# Patient Record
Sex: FEMALE | Race: WHITE | Employment: FULL TIME | URBAN - METROPOLITAN AREA
[De-identification: names, ages, dates, MRNs, and addresses within clinical notes are randomized per-mention and may not be internally consistent; named-entity substitution may affect disease eponyms.]

---

## 2018-07-18 ENCOUNTER — ANESTHESIA EVENT (OUTPATIENT)
Dept: SURGERY | Age: 58
End: 2018-07-18
Payer: COMMERCIAL

## 2018-07-18 RX ORDER — SIMVASTATIN 20 MG/1
20 TABLET, FILM COATED ORAL
COMMUNITY

## 2018-07-18 RX ORDER — PANTOPRAZOLE SODIUM 40 MG/1
40 TABLET, DELAYED RELEASE ORAL
COMMUNITY

## 2018-07-18 RX ORDER — RALOXIFENE HYDROCHLORIDE 60 MG/1
60 TABLET, FILM COATED ORAL
COMMUNITY

## 2018-07-18 RX ORDER — OMEPRAZOLE 20 MG/1
20 CAPSULE, DELAYED RELEASE ORAL
COMMUNITY

## 2018-07-18 RX ORDER — ATENOLOL 50 MG/1
50 TABLET ORAL
COMMUNITY

## 2018-07-18 RX ORDER — SODIUM BICARBONATE 650 MG/1
650 TABLET ORAL 2 TIMES DAILY
COMMUNITY

## 2018-07-18 NOTE — PERIOP NOTES
INSTRUCTIONS [address] [address] [address] MAIN OR [phone] MAIN PRE OP [phone] AMBULATORY PRE OP [phone] PRE-ADMISSION TESTING 21  Surgery Date:   7/19/2018 Is surgery arrival time given by surgeon? NO If [facility] staff will call you between 3 and 7pm the day before your surgery with your arrival time. (If your surgery is on a Monday, we will call you the Friday before.) Call [phone] after 7pm Monday-Friday if you did not receive your arrival time. Answers to Common Questions When You 
Arrive Arrive at the [address] [facility] on the day of your surgery Have your insurance card, photo ID, and any copayment (if needed) Food 
 and  
Drink NO food or drink after midnight the night before surgery This means NO water, gum, mints, coffee, juice, etc. 
No alcohol (beer, wine, liquor) 24 hours before and after surgery Medicine to TAKE Morning of Surgery MEDICATIONS TO TAKE THE MORNING OF SURGERY WITH A SIP OF WATER:  
 Thiola, Atenolol, Raloxefine, Omeprazole, Pantoprazole Medicine To 
STOP  
FOR PAIN 
 NO Aspirin for pain  NO Non-Steroidal Anti-Inflammatory Drugs (NSAIDs:  
for example, Ibuprofen (Advil, Motrin), Naproxen (Aleve)  STOP herbal supplements and vitamins 1 week before surgery  You can take Tylenol  follow instructions on the bottle Blood Thinners  If you take Aspirin, Plavix, Coumadin, blood-thinning or anti-clot medicine, talk to your surgeon and/or follow the instructions from the doctor who told you to take that medicine Clothing Jewelry Valuables Bathing CLOTHING 
 Wear loose, comfortable clothes  Wear glasses instead of contacts  Leave money, jewelry and valuables at home  No make-up, particularly mascara, the day of surgery  REMOVE ALL piercings, rings, and jewelry - leave at home 
 Wear hair loose or down; no pony-tails, buns, or metal hair clips BATHING 
 Follow all special bath instructions (for total joint replacement, spine and bowel surgeries.)  If you shower the morning of surgery, please do not apply any lotions, powders, or deodorants afterwards. Do not shave or trim anywhere 24 hours before surgery. Going Home 
or Spending the Night  SAME-DAY SURGERY: You must have a responsible adult drive you home and stay with you 24 hours after surgery  ADMITS: If your doctor is keeping you into the hospital after surgery, leave personal belongings/luggage in your car until you have a hospital room number. Hospital discharge time is 12 noon Drivers must be here before 12 noon unless you are told differently Follow all instructions so your surgery wont be cancelled. Please, be on time. If a situation occurs and you are delayed the day of surgery, call [phone] or [phone]. If your physical condition changes (like a fever, cold, flu, etc.) call your surgeon as soon as possible. The [facility] staff can be reached at [phone]. OTHER SPECIAL INSTRUCTIONS:  Free  parking 7am-5pm 
 
The patient was contacted  via phone. She  verbalize  understanding of all instructions and does not  need reinforcement.

## 2018-07-19 ENCOUNTER — APPOINTMENT (OUTPATIENT)
Dept: GENERAL RADIOLOGY | Age: 58
End: 2018-07-19
Attending: PODIATRIST
Payer: COMMERCIAL

## 2018-07-19 ENCOUNTER — HOSPITAL ENCOUNTER (OUTPATIENT)
Age: 58
Setting detail: OUTPATIENT SURGERY
Discharge: HOME | End: 2018-07-19
Attending: PODIATRIST | Admitting: PODIATRIST
Payer: COMMERCIAL

## 2018-07-19 ENCOUNTER — ANESTHESIA (OUTPATIENT)
Dept: SURGERY | Age: 58
End: 2018-07-19
Payer: COMMERCIAL

## 2018-07-19 VITALS
HEIGHT: 64 IN | RESPIRATION RATE: 18 BRPM | SYSTOLIC BLOOD PRESSURE: 129 MMHG | WEIGHT: 134 LBS | DIASTOLIC BLOOD PRESSURE: 66 MMHG | OXYGEN SATURATION: 99 % | TEMPERATURE: 97.9 F | HEART RATE: 85 BPM | BODY MASS INDEX: 22.88 KG/M2

## 2018-07-19 PROBLEM — M20.22 HALLUX RIGIDUS OF LEFT FOOT: Status: ACTIVE | Noted: 2018-07-19

## 2018-07-19 PROCEDURE — 74011250637 HC RX REV CODE- 250/637: Performed by: PODIATRIST

## 2018-07-19 PROCEDURE — 77030020782 HC GWN BAIR PAWS FLX 3M -B

## 2018-07-19 PROCEDURE — 77030003044 HC WRE K WRGH -B: Performed by: PODIATRIST

## 2018-07-19 PROCEDURE — C1713 ANCHOR/SCREW BN/BN,TIS/BN: HCPCS | Performed by: PODIATRIST

## 2018-07-19 PROCEDURE — 76001 XR FLUOROSCOPY OVER 60 MINUTES: CPT

## 2018-07-19 PROCEDURE — 77030031139 HC SUT VCRL2 J&J -A: Performed by: PODIATRIST

## 2018-07-19 PROCEDURE — 74011250636 HC RX REV CODE- 250/636: Performed by: ANESTHESIOLOGY

## 2018-07-19 PROCEDURE — 77030020143 HC AIRWY LARYN INTUB CGAS -A: Performed by: ANESTHESIOLOGY

## 2018-07-19 PROCEDURE — 74011250636 HC RX REV CODE- 250/636

## 2018-07-19 PROCEDURE — 77030021807 HC PIN TEMP FIX WRGH -B: Performed by: PODIATRIST

## 2018-07-19 PROCEDURE — 77030032490 HC SLV COMPR SCD KNE COVD -B: Performed by: PODIATRIST

## 2018-07-19 PROCEDURE — 77030021305 HC BLD TEAR RASP BRSM -B: Performed by: PODIATRIST

## 2018-07-19 PROCEDURE — 77030011640 HC PAD GRND REM COVD -A: Performed by: PODIATRIST

## 2018-07-19 PROCEDURE — 77030002986 HC SUT PROL J&J -A: Performed by: PODIATRIST

## 2018-07-19 PROCEDURE — C1762 CONN TISS, HUMAN(INC FASCIA): HCPCS | Performed by: PODIATRIST

## 2018-07-19 PROCEDURE — 77030006773 HC BLD SAW OSC BRSM -A: Performed by: PODIATRIST

## 2018-07-19 PROCEDURE — 77030029732 HC BIT DRL ORTHOLOC 3DI WRGH -B: Performed by: PODIATRIST

## 2018-07-19 PROCEDURE — 77030000032 HC CUF TRNQT ZIMM -B: Performed by: PODIATRIST

## 2018-07-19 PROCEDURE — 76210000050 HC AMBSU PH II REC 0.5 TO 1 HR: Performed by: PODIATRIST

## 2018-07-19 PROCEDURE — 77030018836 HC SOL IRR NACL ICUM -A: Performed by: PODIATRIST

## 2018-07-19 PROCEDURE — 76060000064 HC AMB SURG ANES 2 TO 2.5 HR: Performed by: PODIATRIST

## 2018-07-19 PROCEDURE — 76030000004 HC AMB SURG OR TIME 2 TO 2.5: Performed by: PODIATRIST

## 2018-07-19 PROCEDURE — 74011000250 HC RX REV CODE- 250

## 2018-07-19 PROCEDURE — 77030028224 HC PDNG CST BSNM -A: Performed by: PODIATRIST

## 2018-07-19 PROCEDURE — 74011000250 HC RX REV CODE- 250: Performed by: PODIATRIST

## 2018-07-19 PROCEDURE — 74011250636 HC RX REV CODE- 250/636: Performed by: PODIATRIST

## 2018-07-19 PROCEDURE — 77030016545: Performed by: PODIATRIST

## 2018-07-19 PROCEDURE — 77030020269 HC MISC IMPL: Performed by: PODIATRIST

## 2018-07-19 PROCEDURE — 77030002933 HC SUT MCRYL J&J -A: Performed by: PODIATRIST

## 2018-07-19 PROCEDURE — 77030029099 HC BN WAX SSPC -A: Performed by: PODIATRIST

## 2018-07-19 DEVICE — IMPLANTABLE DEVICE
Type: IMPLANTABLE DEVICE | Site: FOOT | Status: FUNCTIONAL
Brand: ORTHOLOC

## 2018-07-19 DEVICE — GRAFT HUM TISS W4XL4CM CRYOPRESERVED AMNIO MEM REGEN MTRX: Type: IMPLANTABLE DEVICE | Site: FOOT | Status: FUNCTIONAL

## 2018-07-19 DEVICE — IMPLANTABLE DEVICE
Type: IMPLANTABLE DEVICE | Site: FOOT | Status: FUNCTIONAL
Brand: ORTHOLOC 3DI

## 2018-07-19 RX ORDER — BUPIVACAINE HYDROCHLORIDE 5 MG/ML
INJECTION, SOLUTION EPIDURAL; INTRACAUDAL AS NEEDED
Status: DISCONTINUED | OUTPATIENT
Start: 2018-07-19 | End: 2018-07-19 | Stop reason: HOSPADM

## 2018-07-19 RX ORDER — ALBUTEROL SULFATE 0.83 MG/ML
2.5 SOLUTION RESPIRATORY (INHALATION) AS NEEDED
Status: DISCONTINUED | OUTPATIENT
Start: 2018-07-19 | End: 2018-07-19 | Stop reason: HOSPADM

## 2018-07-19 RX ORDER — KETOROLAC TROMETHAMINE 30 MG/ML
INJECTION, SOLUTION INTRAMUSCULAR; INTRAVENOUS AS NEEDED
Status: DISCONTINUED | OUTPATIENT
Start: 2018-07-19 | End: 2018-07-19 | Stop reason: HOSPADM

## 2018-07-19 RX ORDER — LIDOCAINE HYDROCHLORIDE 10 MG/ML
INJECTION INFILTRATION; PERINEURAL AS NEEDED
Status: DISCONTINUED | OUTPATIENT
Start: 2018-07-19 | End: 2018-07-19 | Stop reason: HOSPADM

## 2018-07-19 RX ORDER — FAMOTIDINE 10 MG/ML
INJECTION INTRAVENOUS AS NEEDED
Status: DISCONTINUED | OUTPATIENT
Start: 2018-07-19 | End: 2018-07-19 | Stop reason: HOSPADM

## 2018-07-19 RX ORDER — ONDANSETRON 2 MG/ML
4 INJECTION INTRAMUSCULAR; INTRAVENOUS AS NEEDED
Status: DISCONTINUED | OUTPATIENT
Start: 2018-07-19 | End: 2018-07-19 | Stop reason: HOSPADM

## 2018-07-19 RX ORDER — PROPOFOL 10 MG/ML
INJECTION, EMULSION INTRAVENOUS AS NEEDED
Status: DISCONTINUED | OUTPATIENT
Start: 2018-07-19 | End: 2018-07-19 | Stop reason: HOSPADM

## 2018-07-19 RX ORDER — SODIUM CHLORIDE, SODIUM LACTATE, POTASSIUM CHLORIDE, CALCIUM CHLORIDE 600; 310; 30; 20 MG/100ML; MG/100ML; MG/100ML; MG/100ML
150 INJECTION, SOLUTION INTRAVENOUS CONTINUOUS
Status: DISCONTINUED | OUTPATIENT
Start: 2018-07-19 | End: 2018-07-19 | Stop reason: HOSPADM

## 2018-07-19 RX ORDER — OXYCODONE AND ACETAMINOPHEN 10; 325 MG/1; MG/1
1 TABLET ORAL ONCE
Status: COMPLETED | OUTPATIENT
Start: 2018-07-19 | End: 2018-07-19

## 2018-07-19 RX ORDER — ONDANSETRON 2 MG/ML
INJECTION INTRAMUSCULAR; INTRAVENOUS AS NEEDED
Status: DISCONTINUED | OUTPATIENT
Start: 2018-07-19 | End: 2018-07-19 | Stop reason: HOSPADM

## 2018-07-19 RX ORDER — SODIUM CHLORIDE, SODIUM LACTATE, POTASSIUM CHLORIDE, CALCIUM CHLORIDE 600; 310; 30; 20 MG/100ML; MG/100ML; MG/100ML; MG/100ML
125 INJECTION, SOLUTION INTRAVENOUS CONTINUOUS
Status: DISCONTINUED | OUTPATIENT
Start: 2018-07-19 | End: 2018-07-19 | Stop reason: HOSPADM

## 2018-07-19 RX ORDER — CEFAZOLIN SODIUM/WATER 2 G/20 ML
2 SYRINGE (ML) INTRAVENOUS ONCE
Status: COMPLETED | OUTPATIENT
Start: 2018-07-19 | End: 2018-07-19

## 2018-07-19 RX ORDER — HYDROMORPHONE HYDROCHLORIDE 1 MG/ML
0.5 INJECTION, SOLUTION INTRAMUSCULAR; INTRAVENOUS; SUBCUTANEOUS
Status: DISCONTINUED | OUTPATIENT
Start: 2018-07-19 | End: 2018-07-19 | Stop reason: HOSPADM

## 2018-07-19 RX ORDER — LIDOCAINE HYDROCHLORIDE 20 MG/ML
INJECTION, SOLUTION EPIDURAL; INFILTRATION; INTRACAUDAL; PERINEURAL AS NEEDED
Status: DISCONTINUED | OUTPATIENT
Start: 2018-07-19 | End: 2018-07-19 | Stop reason: HOSPADM

## 2018-07-19 RX ORDER — FENTANYL CITRATE 50 UG/ML
INJECTION, SOLUTION INTRAMUSCULAR; INTRAVENOUS AS NEEDED
Status: DISCONTINUED | OUTPATIENT
Start: 2018-07-19 | End: 2018-07-19 | Stop reason: HOSPADM

## 2018-07-19 RX ORDER — DEXAMETHASONE SODIUM PHOSPHATE 4 MG/ML
INJECTION, SOLUTION INTRA-ARTICULAR; INTRALESIONAL; INTRAMUSCULAR; INTRAVENOUS; SOFT TISSUE AS NEEDED
Status: DISCONTINUED | OUTPATIENT
Start: 2018-07-19 | End: 2018-07-19 | Stop reason: HOSPADM

## 2018-07-19 RX ORDER — DIPHENHYDRAMINE HYDROCHLORIDE 50 MG/ML
12.5 INJECTION, SOLUTION INTRAMUSCULAR; INTRAVENOUS AS NEEDED
Status: DISCONTINUED | OUTPATIENT
Start: 2018-07-19 | End: 2018-07-19 | Stop reason: HOSPADM

## 2018-07-19 RX ORDER — LIDOCAINE HYDROCHLORIDE 10 MG/ML
0.1 INJECTION, SOLUTION EPIDURAL; INFILTRATION; INTRACAUDAL; PERINEURAL AS NEEDED
Status: DISCONTINUED | OUTPATIENT
Start: 2018-07-19 | End: 2018-07-19 | Stop reason: HOSPADM

## 2018-07-19 RX ORDER — MIDAZOLAM HYDROCHLORIDE 1 MG/ML
INJECTION, SOLUTION INTRAMUSCULAR; INTRAVENOUS AS NEEDED
Status: DISCONTINUED | OUTPATIENT
Start: 2018-07-19 | End: 2018-07-19 | Stop reason: HOSPADM

## 2018-07-19 RX ORDER — PROPOFOL 10 MG/ML
INJECTION, EMULSION INTRAVENOUS
Status: DISCONTINUED | OUTPATIENT
Start: 2018-07-19 | End: 2018-07-19 | Stop reason: HOSPADM

## 2018-07-19 RX ADMIN — OXYCODONE HYDROCHLORIDE AND ACETAMINOPHEN 1 TABLET: 10; 325 TABLET ORAL at 13:15

## 2018-07-19 RX ADMIN — FENTANYL CITRATE 50 MCG: 50 INJECTION, SOLUTION INTRAMUSCULAR; INTRAVENOUS at 10:46

## 2018-07-19 RX ADMIN — FENTANYL CITRATE 25 MCG: 50 INJECTION, SOLUTION INTRAMUSCULAR; INTRAVENOUS at 09:56

## 2018-07-19 RX ADMIN — SODIUM CHLORIDE, SODIUM LACTATE, POTASSIUM CHLORIDE, AND CALCIUM CHLORIDE 150 ML/HR: 600; 310; 30; 20 INJECTION, SOLUTION INTRAVENOUS at 09:00

## 2018-07-19 RX ADMIN — FAMOTIDINE 20 MG: 10 INJECTION INTRAVENOUS at 10:24

## 2018-07-19 RX ADMIN — SODIUM CHLORIDE, SODIUM LACTATE, POTASSIUM CHLORIDE, AND CALCIUM CHLORIDE: 600; 310; 30; 20 INJECTION, SOLUTION INTRAVENOUS at 12:14

## 2018-07-19 RX ADMIN — Medication 2 G: at 10:04

## 2018-07-19 RX ADMIN — DEXAMETHASONE SODIUM PHOSPHATE 8 MG: 4 INJECTION, SOLUTION INTRA-ARTICULAR; INTRALESIONAL; INTRAMUSCULAR; INTRAVENOUS; SOFT TISSUE at 10:24

## 2018-07-19 RX ADMIN — MIDAZOLAM HYDROCHLORIDE 2 MG: 1 INJECTION, SOLUTION INTRAMUSCULAR; INTRAVENOUS at 09:51

## 2018-07-19 RX ADMIN — KETOROLAC TROMETHAMINE 30 MG: 30 INJECTION, SOLUTION INTRAMUSCULAR; INTRAVENOUS at 11:58

## 2018-07-19 RX ADMIN — FENTANYL CITRATE 25 MCG: 50 INJECTION, SOLUTION INTRAMUSCULAR; INTRAVENOUS at 09:53

## 2018-07-19 RX ADMIN — PROPOFOL 120 MG: 10 INJECTION, EMULSION INTRAVENOUS at 10:15

## 2018-07-19 RX ADMIN — FENTANYL CITRATE 25 MCG: 50 INJECTION, SOLUTION INTRAMUSCULAR; INTRAVENOUS at 09:51

## 2018-07-19 RX ADMIN — PROPOFOL 50 MCG/KG/MIN: 10 INJECTION, EMULSION INTRAVENOUS at 09:57

## 2018-07-19 RX ADMIN — ONDANSETRON 4 MG: 2 INJECTION INTRAMUSCULAR; INTRAVENOUS at 09:58

## 2018-07-19 RX ADMIN — PROPOFOL 50 MG: 10 INJECTION, EMULSION INTRAVENOUS at 09:57

## 2018-07-19 RX ADMIN — FENTANYL CITRATE 25 MCG: 50 INJECTION, SOLUTION INTRAMUSCULAR; INTRAVENOUS at 09:55

## 2018-07-19 RX ADMIN — FENTANYL CITRATE 50 MCG: 50 INJECTION, SOLUTION INTRAMUSCULAR; INTRAVENOUS at 10:14

## 2018-07-19 RX ADMIN — LIDOCAINE HYDROCHLORIDE 40 MG: 20 INJECTION, SOLUTION EPIDURAL; INFILTRATION; INTRACAUDAL; PERINEURAL at 09:57

## 2018-07-19 NOTE — IP AVS SNAPSHOT
[address] 
[phone] Patient: Isra Kc MRN: [id number] :1960 A check sadia indicates which time of day the medication should be taken. My Medications ASK your doctor about these medications Instructions Each Dose to Equal  
 Morning Noon Evening Bedtime  
 atenolol 50 mg tablet Commonly known as:  TENORMIN Your last dose was:   Your next dose is:   Take 50 mg by mouth every morning. 50 mg  
    
   
   
   
  
 omeprazole 20 mg capsule Commonly known as:  PRILOSEC Your last dose was:   Your next dose is:   Take 20 mg by mouth every morning. 20 mg  
    
   
   
   
  
 pantoprazole 40 mg tablet Commonly known as:  PROTONIX Your last dose was:   Your next dose is:   Take 40 mg by mouth every morning. 40 mg  
    
   
   
   
  
 raloxifene 60 mg tablet Commonly known as:  EVISTA Your last dose was:   Take 60 mg by mouth every morning. 60 mg  
    
   
  
   
   
  
 simvastatin 20 mg tablet Commonly known as:  ZOCOR Your last dose was:   Take 20 mg by mouth nightly. 20 mg  
    
   
   
   
  
  
 sodium bicarbonate 650 mg tablet Your last dose was:   Take 650 mg by mouth two (2) times a day. 650 mg  
    
   
  
   
   
  
  
 THIOLA PO Your last dose was:   Take 300 mg by mouth three (3) times daily.   
 300 mg

## 2018-07-19 NOTE — DISCHARGE INSTRUCTIONS
[facility] MEDICATION AND   SIDE EFFECT GUIDE    The [facility] Insurance MEDICATION AND SIDE EFFECT GUIDE was provided to the PATIENT AND CARE PROVIDER. Information provided includes instruction about drug purpose and common side effects for the following medications:     Percocet,  Nausea med P.R.I.C.E. INSTRUCTIONS    PRICE is an acronym that stands for Protect, Rest, Ice, Compression, and Elevation (sometimes you might see the acronym RICE.)   Listed below are five activities one can do for an injured limb or soft tissue injury. While much anecdotal understanding learned through many years of experience supports these seemingly common sense treatments, building scientific evidence is showing how and why these treatment principles are proving to be so beneficial.  Below is a breakdown of what the PRICE principles entail to speed healing along. PROTECT may sound like an obvious thing to do, and really, it is common sense. After an injury or surgery, protecting the site that hurts help to prevent further injury. REST is essential for an injured limb. Like protection, the more you are up on an injured limb, especially in the early stages of an injury, the more damage you can do. Rest means no activities that would involve the use of the injured tissues so that the early stages of healing can begin without  interruption. ICE \"is perhaps the simplest and oldest [therapy] in the treatment of soft tissue injuries. \"4    Ice help decrease swelling in inflamed and damaged tissues, can diminish the feeling of pain and decrease muscle spasms, and, immediately after an injury,  can slow cellular metabolism and help to prevent further tissue injury from oxygen starvation caused by the swelling. 5      COMPRESSION help decrease pain by limiting movement of an injured limb.   Compression can be found through the use of an elastic wrap bandage, a cast, splint, or simply a snug cooling cuff or an ice pack and pillow. ELEVATION is a very important intervention. Placing the injury above the level of the heart whenever possible helps decrease swelling by using gravity to one's advantage . Placing the injury above the level of the heart also helps prevent, or at least decrease, the throbbing pain that is sometimes experienced after surgery or injury. Sources:  1. Muscle injuries: optimizing recovery (2007) Best Practice & Research Clinical Rheumatology Vol. 21, No. 2, pp 330-012, Accessed 9/26/11    2. PRICE first aid guidelines - Protection, Rest, Ice, Compression and Elevation By Roberto Coleman, [employer]. com Guide, Updated March 27, 2011, Accessed 9/26/11 [email or web address]://[email or web address] [email or web address].com/cs/rehab/a/rice. htm    3. Rest Ice Compression Elevation: RICE for injuries, Accessed 9/26/11 [employer].ch. com/rest-ice-compression-elevation. html    4. The use of ice in the treatment of acute soft-tissue injury (2004) Susan, Vol. 32, No. 1, pp 251-261, Accessed 9/26/11 http://ajs.[employer]/content/32/1/251.full.pdf+html    Soft tissue damage and healing; theory and techniques, www.iaaf.org, Ch. 9 of  medical page, by brad Bonner And Juana Forde 9/27/11 [facility].gl. pdf Crutch Ambulation Precautions    Your doctor has ordered crutch use to aid in your post-operative healing and to allow you to get around your home environment as you heal.  Evidence and research has show that early mobilization speeds healing and recovery, but too much activity too soon can slow your progress down. It is important that you follow your doctors orders carefully. Your doctor has prescribed the following for you:    []       Non-Weight Bearing with your injured and healing leg until your follow up,     and then your progress will be evaluated at that time.     []       Non-weight Bearing until your motor-sensory blockade has resolved, and     then+:    []       Touch-Down Weight Bearing  []       25% Weight Bearing  []       50% Weight Bearing  []       Advance to Full Weight Bearing as tolerated, crutches for support and as     needed. Partial Weight Bearin-point Gait  Non-Weight Bearing: 3-point Swing Through Gait    Crutch walking seems simple when watching someone else do it. However, if not done correctly, it can be very difficult and dangerous. Here are some tips and reminders that will be helpful to you at home. (Non-Weight bearing shown)    1. When coming to a standing position from a sitting position, remember:  a. Place both crutches in the hand opposite the side of your injury. b. Lean forward; push down on crutches and arm of chair. c. Stand up, straighten your good leg and get your balance. Once you are balanced, move crutches to proper place under arms. Get balanced again before attempting to walk. (Non-Weight bearing shown)     2. When Walking, Remember:  a. Place your crutches out approximately 10-12 inches in front of you with a wide enough stance for your hips to fit through. b. Push down on crutch hand rests - this is the only place where your weight is to be distributed. c. Swing your \"good\" leg forward to meet up with the crutch location. Once you get the hang of it, you can swing your \"good\" leg approximately 10-12 inches past the crutch location, but in the beginning when you are just getting comfortable with crutch use, GO SLOW and TAKE YOUR TIME. d. The \"arm pit\" pads are NOT for putting your weight on. Rather, the pads are for squeezing between the inside of your arm and the chest wall to keep crutch from moving when you are walking. You should have a 2-3 finger-width space between the armpit and the pad. (Non-Weight bearing shown)    3.  When sitting:  a. Back up to chair until the back of the uninvolved good leg touches the chair or seat.    b. Move both crutches to the \"injured leg\" side. c. Hold crutches at hand  area. d. Reach back with free hand for arm of chair, slide involved leg forward and lower yourself slowly onto seat. (Note: The pictures above show a non-weight bearing patient negotiating stairs and the injured leg away from load bearing. If you are allowed limited or full weight bearing on your surgical leg/foot, the surgical leg position should correspond to the location of the crutches on the stair step.)    4. When climbing stairs, remember: \"Up with the good; down with the bad. \"             a. This means when going up stairs, you: step your good leg up first, then the crutches and your bad leg follow. b. When going down stairs, the crutches and your bad leg step down first, followed by your good leg. Remember: Take it slowly! In the examples above you are shown non-weight bearing as this is the most difficult technique to master. However, partial weight bearing is also used depending on what your doctor prescribes after surgery. With partial weight bearin. The injured leg is allowed to carry a prescribed amount of your body weight. 2. Touch-down weight bearing - toes touch the ground with least amount of weight bearing  3. 25% - 100% weight bearing - allow for a quarter to all of your body weight to be carried by your injured and healing leg, depending on your doctors orders. 4. Typically, apply as much weight to your injured leg as you can tolerate. If there is pain, you may be doing too much weight bearing and you may need to slow your progress down. \"Let pain be your guide. \"      Most importantly, follow your doctor's instructions carefully. Doing too much too soon can possibly impede your healing progress and delay your recovery. Hints and Safety:   Never leave crutches behind and attempt to hop where you are going.    Always use good posture. Do not lean on arm pits, this can cause severe nerve damage in arms.  Inspect crutch tips periodically and replace as necessary.  Dont play on crutches.  Remove or set aside things on the floor that can trip someone on crutches, like throw rugs, loose wires or extension cords, clutter on the floor.  Use caution if you have slick, waxed or wet floors, small pets, uneven floors or deep carpet. Be careful, think, and take your time! []    Physical Therapy referral made before discharge for crutch training and evaluation. Post-Operative Instructions:    Patient Name: Ousmane Flores  Patient MRN: [id number]  : 1960  Discharged Date: 2018      MEDICATIONS:   -If you experience nausea, take anti-nausea medication   -Take pain medication regularly for first 48 hours. Then take as needed for pain.     -Often anti-nausea medication helps relieve pain due to it's mild    sedative effect.   -Constipation can be a common side effect when taking narcotic pain   medications, take precautions to avoid this:    -Drink plenty of fluids    -Eat plenty of fiber    -Avoid caffeine and dairy products    -Take stool softener if needed (Colace/Dulcolax)    DIET:   -Eat light foods for first meal today (ie: dry cereal/toast/crackers, clear fluids). -If tolerated first meal, then can eat normally at second meal.    ACTIVITY:   -Relative BED REST for first 72 hours (only getting to bathroom, bedroom,   kitchen)   -Keep surgical shoe/boot on at all times (even when sleeping)   -Elevate surgical site at all times (at least 6 inches above hip level)   -Apply ice pack to just above surgical site (NOT directly on the site), 10 mins  on and 20 mins off for the first 72 hours.   -Weight-bearing: Non weight bearing left leg. DRESSINGS/SHOWER:   -Keep dressing clean, dry, and intact at all times.   -Reinforce dressing as needed, but DO NOT remove dressing!!     EMERGENCY:   -Call office ([phone]) or on all pager after hours ([phone]) if. ..    -bandages become wet or heavy drainage/bleeding noted    -medications are not helping your pain    -you injure or bump the surgical site    -you have fever over 101.5 degrees    FOLLOW-UP:   -Make sure you follow-up with your doctor within 1 week of surgery.    -Call office ([phone]) if you need to schedule appointment     ACTIVITY:  · Elevate feet   · Use crutches as directed by your doctor    DIET:  · Clear liquids until no nausea or vomiting; then light diet for the first day  · An advance to regular diet On second day, unless your doctor orders otherwise. PAIN:  · Take pain medication ouster acted by your doctor. · Call your doctor if pain is not relieved by medication. · Do not take aspirin or blood thinners until directed by your doctor.       Patients signature:  Date: 7/19/2018  Discharging Nurse:    Physician: Smita Castro DPM

## 2018-07-19 NOTE — OP NOTES
[facility]  OPERATIVE REPORT    Richie Hoffman  MR#: [id number]  : 1960  ACCOUNT #: [de-identified]   DATE OF SERVICE: 2018    SURGEON:  Juan C Mccormick DPM    ANESTHESIOLOGIST:  Tadeo Walker MD    ASSISTANT SURGEON:  None. PREOPERATIVE DIAGNOSIS:  Hallux rigidus deformity of the left foot. POSTOPERATIVE DIAGNOSIS:  Hallux rigidus deformity of the left foot. PROCEDURE PERFORMED:  First metatarsophalangeal joint fusion with plate and screw fixation of the left foot. ANESTHESIA:  General.    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 COMPLICATIONS:  None. ESTIMATED BLOOD LOSS:  5 mL. IMPLANTS:  As listed on the preoperative report. SPECIMENS REMOVED:   None. INDICATIONS:  The patient is a 78-year-old white female who presented to the office with a chronically painful first metatarsophalangeal joint of the left foot. The patient reported that she had undergone a course of nonsurgical care in the form of change of shoe gear, change in activity, over-the-counter orthoses without symptomatic relief. Patient had all treatment options reviewed with her from conservative to surgical.  Patient declined further nonsurgical care. The patient was made aware of risks, benefits, alternatives and potential complications of surgical management including but not limited to need for additional surgery, failure of the procedure to achieve desired result, swelling, stiffness, scarring, numbness, nerve damage, the potential of prolonged pain, postoperatively, the potential of a chronic pain syndrome. The potential of infection of the soft tissue under bone, potential of failure of fixation over or under correction, malposition of fusion, nonunion of fusion, delayed union of fusion.   The patient had the consent reviewed and understood and signed and elected for surgical management of this condition. PREPARATION OF PROCEDURE:  The patient was taken to the operating room and placed on the operating room table in supine position. The patient had a well-padded pneumatic ankle tourniquet applied to the left ankle. The patient had IV sedation initiated by the anesthesia team and then had 1 gram of Ancef for preoperative prophylactic antibiotics and 16 mL of 1% lidocaine for preoperative analgesia. The patient had the left foot prepped and draped in normal sterile fashion and the pneumatic ankle tourniquet was elevated to 250 mmHg and the procedure began. PROCEDURE #1: Correction of hallux rigidus with first metatarsophalangeal joint fusion and implant and plate and screw fixation. The attention was directed towards the dorsal medial aspect of the first metatarsophalangeal joint where a medial incision was made. The incision was medial to the extensor hallucis longus tendon and was approximately 5.5 cm in length. The incision was deepened down to the level of subcutaneous tissue, taking care to Bovie retract and ligate all neurovascular structures as necessary. Then through blunt and sharp dissection, the incision was deepened down to the level of the periosteum and joint capsule. Then with use of #15 blade, a straight periosteal incision was made and with the use of a Benicia elevator, periosteum was elevated off the first metatarsal and the base of the hallux of the left foot. With the use of a Brown-Adson forceps and a 15 blade, the capsular attachments were elevated off of the first metatarsophalangeal joint. There was noted to be severe hypertrophic spurring dorsally, medially and laterally as well as wearing of over 70% of the articular cartilage of the first metatarsophalangeal joint. Based on the severe wearing of the joint, a joint fusion was performed instead of just a cheilectomy. This had been discussed preoperatively with the patient and she had consented for this procedure. With the use of a handheld sagittal saw, the exostoses were resected and then the remaining articular cartilage was denuded off of the joint space with the use of a handheld curette and a power reciprocating rasp. The site was then fenestrated with the use of a 2.0 mm drill bit to penetrate the subchondral bone plate. The joint then was brought together and held with temporary fixation with a 0.62 K-wire while the toe was held in a neutral position in the frontal plane at the metatarsophalangeal joint. Then, due to her adduction of her metatarsal bones the hallux was placed in a slightly adducted position, this position prevented the first toe from rubbing against the second toe and then the hallux was placed with slight dorsiflexion of the proximal phalanx as well. Next a flat plate was put in place after this temporary fixation was achieved with a 0.62 K-wire and while evaluating the position of the site, it was confirmed the joint was in adequate position for fusion. Intraoperative C-arm confirmed the same. Then, using strict AO technique, the 3D crosscheck first metatarsophalangeal joint plate and screw set was utilized. The plate was put over the joint space and held with a temporary Olive wire fixation. Intraoperative C-arm confirmed excellent position of the plate. Then, using the technique guide, 2 proximal 2.7 mm screws were put in place 1 locking and 1 nonlocking. Then, the interfragmentary screw was drilled and measured and then started. Then, the temporary fixation and olive wires were removed and then the compression screw across the joint. Intraoperative C-arm confirmed excellent reduction of preoperative deformity and good bone to bone contact with the fusion site. The interfrag screw did not penetrate the sesamoid apparatus and did provide adequate compression of the fusion site. Small portions of the bone spur were morselized and put into the fusion site to aid in fusion.   Then using strict AO technique two 3.5 mm screws were put him into the 2 proximal holes of the plate, 1 nonlocking and 1 locking and this formed an extremely stable construct. Then, a 4 cm x 4 cm thin sheet of the amniotic tissue from the company named [employer] was put in place over the fusion site and the deep closure was obtained with the use of 2-0 Vicryl in interrupted simple suture fashion of the capsule and periosteum. The subcutaneous tissue was closed with the use of 3-0 Monocryl in interrupted simple suture fashion. Skin was closed with the use of continuous 4-0 Monocryl. The patient received 15 mL of 0.5% Marcaine plain for postoperative analgesia. Then, Xeroform, 4 x 4's, Dennys and an Ace bandage were applied followed by a Waller compression bandage and a posterior splint. It should be noted, prior to injection of the postoperative analgesia, the pneumatic ankle tourniquet had been deflated. It was noted that the capillary filling time was immediate to all digits of the left foot. Postoperatively, the patient was stable. The patient tolerated the procedure well without complication, was transported from the OR to the postanesthesia care unit with neurovascular status intact to all digits of the left foot and be followed up as an outpatient.       MARIA LUISA León  D: 07/19/2018 13:13     T: 07/19/2018 13:42  JOB #: [id number]

## 2018-07-19 NOTE — ANESTHESIA PREPROCEDURE EVALUATION
Anesthetic History     PONV          Review of Systems / Medical History  Patient summary reviewed and pertinent labs reviewed    Pulmonary  Within defined limits                 Neuro/Psych   Within defined limits           Cardiovascular    Hypertension              Exercise tolerance: >4 METS     GI/Hepatic/Renal     GERD    Renal disease: stones       Endo/Other  Within defined limits           Other Findings              Physical Exam    Airway  Mallampati: II  TM Distance: 4 - 6 cm  Neck ROM: normal range of motion   Mouth opening: Normal     Cardiovascular  Regular rate and rhythm,  S1 and S2 normal,  no murmur, click, rub, or gallop  Rhythm: regular  Rate: normal         Dental    Dentition: Caps/crowns     Pulmonary  Breath sounds clear to auscultation               Abdominal  GI exam deferred       Other Findings            Anesthetic Plan    ASA: 2  Anesthesia type: MAC          Induction: Intravenous  Anesthetic plan and risks discussed with: Patient

## 2018-07-19 NOTE — BRIEF OP NOTE
BRIEF OPERATIVE NOTE    Date of Procedure: 7/19/2018   Preoperative Diagnosis: HALUX RIGIDUS  Postoperative Diagnosis: HALUX RIGIDUS    Procedure(s):  FIRST METATARSAL PHALANGEAL JOINT FUSION WITH PLATE AND SCREW FIXATION LEFT FOOT  Surgeon(s) and Role:     * Patience Moritz., DPM - Primary         Surgical Assistant: None. Surgical Staff:  Circ-1: Aayush Travis RN  Circ-Relief: Rut Julian RN  Scrub Tech-1: Sun Casanovaub RN-Relief: Layne Gibson RN  Surg Asst-1: Tono Ip  Surg Asst-Relief: Faith Wick RN; Rowan Romero RN; Gena Daniels RN  Event Time In   Incision Start 1013   Incision Close 1219     Anesthesia: MAC   Estimated Blood Loss: 5 ML  Specimens: * No specimens in log *   Findings: Degenerative arthritis. Complications: None. Implants:   Implant Name Type Inv.  Item Serial No.  Lot No. LRB No. Used Action   MATRIX WND MEMBRN 4X4CM -- CLARIX 100 - [id number]  MATRIX WND MEMBRN 4X4CM -- CLARIX 100 [id number]  [id number] Left 1 Implanted   SCR BNE NON LCK HD 2.7X14MM -- [place] - SNA  SCR BNE NON LCK HD 2.7X14MM -- ORTHOLOC HALLUX NA [email or web address] NA Left 1 Implanted   SCR BNE LCK HD 2.7X16MM -- ORTHOLOC HALLUX - SNA  SCR BNE LCK HD 2.7X16MM -- ORTHOLOC HALLUX NA [email or web address] NA Left 1 Implanted   SCR BNE WILBER NLCK LP 3.5X18MM -- ORTHOLOC HALLUX - SNA  SCR BNE WILBER NLCK LP 3.5X18MM -- ORTHOLOC HALLUX NA [email or web address] NA Left 1 Implanted   locking screw Screw  NA [employer] NA Left 1 Implanted   LEFT MTP PLATE Plate  NA [employer] NA Left 1 Implanted   LAG SCREW Screw   NA [employer] NA Left 1 Implanted

## 2018-07-19 NOTE — IP AVS SNAPSHOT
[address] 
 
 
 [address] [facility] 
[phone] Patient: Meliton Gitelman MRN: [id number] :1960 About your hospitalization You were admitted on:  2018 You last received care in the:  [facility] You were discharged on:  2018 Why you were hospitalized Your primary diagnosis was:  Not on File Your diagnoses also included:  Hallux Rigidus Of Left Foot Follow-up Information Follow up With Details Comments Contact Info Provider Unknown   Patient not available to ask Pedro Brunsonus [address] [facility] [address] [facility] 
[phone] Discharge Orders None A check sadia indicates which time of day the medication should be taken. My Medications ASK your doctor about these medications Instructions Each Dose to Equal  
 Morning Noon Evening Bedtime  
 atenolol 50 mg tablet Commonly known as:  TENORMIN Your last dose was:   Your next dose is:   Take 50 mg by mouth every morning. 50 mg  
    
   
   
   
  
 omeprazole 20 mg capsule Commonly known as:  PRILOSEC Your last dose was:   Your next dose is:   Take 20 mg by mouth every morning. 20 mg  
    
   
   
   
  
 pantoprazole 40 mg tablet Commonly known as:  PROTONIX Your last dose was:   Your next dose is:   Take 40 mg by mouth every morning. 40 mg  
    
   
   
   
  
 raloxifene 60 mg tablet Commonly known as:  EVISTA Your last dose was:   Take 60 mg by mouth every morning. 60 mg  
    
   
  
   
   
  
 simvastatin 20 mg tablet Commonly known as:  ZOCOR Your last dose was:   Take 20 mg by mouth nightly. 20 mg  
    
   
   
   
  
  
 sodium bicarbonate 650 mg tablet Your last dose was:   Take 650 mg by mouth two (2) times a day.   
 650 mg  
    
   
  
   
   
  
 THIOLA PO Your last dose was:  7/19 Take 300 mg by mouth three (3) times daily. 300 mg Discharge Instructions R[address]a Dasilva 55 EFFECT GUIDE The R[address]a Dasilva 55 EFFECT GUIDE was provided to the PATIENT AND CARE PROVIDER. Information provided includes instruction about drug purpose and common side effects for the following medications:  
***P.R.I.C.E. INSTRUCTIONS PRICE is an acronym that stands for Protect, Rest, Ice, Compression, and Elevation (sometimes you might see the acronym RICE.)   Listed below are five activities one can do for an injured limb or soft tissue injury. While much anecdotal understanding learned through many years of experience supports these seemingly common sense treatments, building scientific evidence is showing how and why these treatment principles are proving to be so beneficial.  Below is a breakdown of what the PRICE principles entail to speed healing along. PROTECT may sound like an obvious thing to do, and really, it is common sense. After an injury or surgery, protecting the site that hurts help to prevent further injury. REST is essential for an injured limb. Like protection, the more you are up on an injured limb, especially in the early stages of an injury, the more damage you can do. Rest means no activities that would involve the use of the injured tissues so that the early stages of healing can begin without  interruption. ICE \"is perhaps the simplest and oldest [therapy] in the treatment of soft tissue injuries. \"4    Ice help decrease swelling in inflamed and damaged tissues, can diminish the feeling of pain and decrease muscle spasms, and, immediately after an injury,  can slow cellular metabolism and help to prevent further tissue injury from oxygen starvation caused by the swelling. 5   
 
COMPRESSION help decrease pain by limiting movement of an injured limb. Compression can be found through the use of an elastic wrap bandage, a cast, splint, or simply a snug cooling cuff or an ice pack and pillow. ELEVATION is a very important intervention. Placing the injury above the level of the heart whenever possible helps decrease swelling by using gravity to one's advantage . Placing the injury above the level of the heart also helps prevent, or at least decrease, the throbbing pain that is sometimes experienced after surgery or injury. Sources: 1. Muscle injuries: optimizing recovery (2007) Best Practice & Research Clinical Rheumatology Vol. 21, No. 2, pp 317-331, Accessed 9/26/11 2. PRICE first aid guidelines - Protection, Rest, Ice, Compression and Elevation By Meshfirelucy Touch of Classic. com Guide, Updated March 27, 2011, Accessed 9/26/11 http://sportsmedicine. Apptimatecom/cs/rehab/a/rice. htm 3. Rest Ice Compression Elevation: RICE for injuries, Accessed 9/26/11 LipLotion.ch. com/rest-ice-compression-elevation. html 4. The use of ice in the treatment of acute soft-tissue injury (2004) Becksamueltabelkis, Vol. 32, No. 1, pp 251-261, Accessed 9/26/11 http://ajs.Okanub.com/content/32/1/251.full.pdf+html Soft tissue damage and healing; theory and techniques, www.iaaf.org, Ch. 9 of  medical page, by brad Roe And Amairani Carl 9/27/11 [facility] Satellites.gl. pdf Crutch Ambulation Precautions Your doctor has ordered crutch use to aid in your post-operative healing and to allow you to get around your home environment as you heal.  Evidence and research has show that early mobilization speeds healing and recovery, but too much activity too soon can slow your progress down. It is important that you follow your doctors orders carefully.   Your doctor has prescribed the following for you: 
 
[]       Non-Weight Bearing with your injured and healing leg until your follow up,     and then your progress will be evaluated at that time. []       Non-weight Bearing until your motor-sensory blockade has resolved, and     then+: 
 
[]       Touch-Down Weight Bearing 
[]       25% Weight Bearing 
[]       50% Weight Bearing 
[]       Advance to Full Weight Bearing as tolerated, crutches for support and as     needed. Partial Weight Bearin-point Gait Non-Weight Bearing: 3-point Swing Through Gait Crutch walking seems simple when watching someone else do it. However, if not done correctly, it can be very difficult and dangerous. Here are some tips and reminders that will be helpful to you at home. (Non-Weight bearing shown) 1. When coming to a standing position from a sitting position, remember: 
a. Place both crutches in the hand opposite the side of your injury. b. Lean forward; push down on crutches and arm of chair. c. Stand up, straighten your good leg and get your balance. Once you are balanced, move crutches to proper place under arms. Get balanced again before attempting to walk. (Non-Weight bearing shown) 2. When Walking, Remember: 
a. Place your crutches out approximately 10-12 inches in front of you with a wide enough stance for your hips to fit through. b. Push down on crutch hand rests - this is the only place where your weight is to be distributed. c. Swing your \"good\" leg forward to meet up with the crutch location. Once you get the hang of it, you can swing your \"good\" leg approximately 10-12 inches past the crutch location, but in the beginning when you are just getting comfortable with crutch use, GO SLOW and TAKE YOUR TIME. d. The \"arm pit\" pads are NOT for putting your weight on. Rather, the pads are for squeezing between the inside of your arm and the chest wall to keep crutch from moving when you are walking. You should have a 2-3 finger-width space between the armpit and the pad. (Non-Weight bearing shown) 3. When sitting: 
a. Back up to chair until the back of the uninvolved good leg touches the chair or seat.   
b. Move both crutches to the \"injured leg\" side. c. Hold crutches at hand  area. d. Reach back with free hand for arm of chair, slide involved leg forward and lower yourself slowly onto seat. (Note: The pictures above show a non-weight bearing patient negotiating stairs and the injured leg away from load bearing. If you are allowed limited or full weight bearing on your surgical leg/foot, the surgical leg position should correspond to the location of the crutches on the stair step.) 4. When climbing stairs, remember: \"Up with the good; down with the bad. \"            
a. This means when going up stairs, you: step your good leg up first, then the crutches and your bad leg follow. b. When going down stairs, the crutches and your bad leg step down first, followed by your good leg. Remember: Take it slowly! In the examples above you are shown non-weight bearing as this is the most difficult technique to master. However, partial weight bearing is also used depending on what your doctor prescribes after surgery. With partial weight bearin. The injured leg is allowed to carry a prescribed amount of your body weight. 2. Touch-down weight bearing - toes touch the ground with least amount of weight bearing 3. 25% - 100% weight bearing - allow for a quarter to all of your body weight to be carried by your injured and healing leg, depending on your doctors orders. 4. Typically, apply as much weight to your injured leg as you can tolerate. If there is pain, you may be doing too much weight bearing and you may need to slow your progress down. \"Let pain be your guide. \" Most importantly, follow your doctor's instructions carefully.   Doing too much too soon can possibly impede your healing progress and delay your recovery. Hints and Safety: 
? Never leave crutches behind and attempt to hop where you are going. ? Always use good posture. Do not lean on arm pits, this can cause severe nerve damage in arms. ? Inspect crutch tips periodically and replace as necessary. ? Dont play on crutches. ? Remove or set aside things on the floor that can trip someone on crutches, like throw rugs, loose wires or extension cords, clutter on the floor. ? Use caution if you have slick, waxed or wet floors, small pets, uneven floors or deep carpet. Be careful, think, and take your time! []    Physical Therapy referral made before discharge for crutch training and evaluation. Post-Operative Instructions: 
 
Patient Name: Terri Soto Patient MRN: [id number] : 1960 Discharged Date: 2018 MEDICATIONS: 
 -If you experience nausea, take anti-nausea medication 
 -Take pain medication regularly for first 48 hours. Then take as needed for pain.  
  -Often anti-nausea medication helps relieve pain due to it's mild    sedative effect. 
 -Constipation can be a common side effect when taking narcotic pain   medications, take precautions to avoid this: 
  -Drink plenty of fluids 
  -Eat plenty of fiber 
  -Avoid caffeine and dairy products 
  -Take stool softener if needed (Colace/Dulcolax) DIET: 
 -Eat light foods for first meal today (ie: dry cereal/toast/crackers, clear fluids). -If tolerated first meal, then can eat normally at second meal. 
 
ACTIVITY: 
 -Relative BED REST for first 72 hours (only getting to bathroom, bedroom,   kitchen) 
 -Keep surgical shoe/boot on at all times (even when sleeping) -Elevate surgical site at all times (at least 6 inches above hip level) -Apply ice pack to just above surgical site (NOT directly on the site), 10 mins  on and 20 mins off for the first 72 hours. -Weight-bearing: Non weight bearing left leg. DRESSINGS/SHOWER: 
 -Keep dressing clean, dry, and intact at all times. 
 -Reinforce dressing as needed, but DO NOT remove dressing!! EMERGENCY: 
 -Call office ([phone]) or on all pager after hours ([phone]) if. .. 
  -bandages become wet or heavy drainage/bleeding noted 
  -medications are not helping your pain 
  -you injure or bump the surgical site 
  -you have fever over 101.5 degrees FOLLOW-UP: 
 -Make sure you follow-up with your doctor within 1 week of surgery. 
  -Call office ([phone]) if you need to schedule appointment ACTIVITY: 
· Elevate feet · Use crutches as directed by your doctor DIET: 
· Clear liquids until no nausea or vomiting; then light diet for the first day · An advance to regular diet On second day, unless your doctor orders otherwise. PAIN: 
· Take pain medication ouster acted by your doctor. · Call your doctor if pain is not relieved by medication. · Do not take aspirin or blood thinners until directed by your doctor. Patients signature: 
Date: 7/19/2018 Discharging Nurse: 
 
Physician: Briseyda Galindo, DPM 
 
 
 
 
 
 
 
 
 
  
  
  
MuciMed Announcement We are excited to announce that we are making your provider's discharge notes available to you in MuciMed. You will see these notes when they are completed and signed by the physician that discharged you from your recent hospital stay. If you have any questions or concerns about any information you see in MuciMed, please call the Health Information Department where you were seen or reach out to your Primary Care Provider for more information about your plan of care. Introducing [facility]! Jose R Cortez introduces MuciMed patient portal. Now you can access parts of your medical record, email your doctor's office, and request medication refills online. 1. In your internet browser, go to [email or web address] 2. Click on the First Time User? Click Here link in the Sign In box. You will see the New Member Sign Up page. 3. Enter your Oatmeal Access Code exactly as it appears below. You will not need to use this code after youve completed the sign-up process. If you do not sign up before the expiration date, you must request a new code. · Oatmeal Access Code: [email or web address] Expires: 10/16/2018 11:59 AM 
 
4. Enter the last four digits of your Social Security Number (xxxx) and Date of Birth (mm/dd/yyyy) as indicated and click Submit. You will be taken to the next sign-up page. 5. Create a Oatmeal ID. This will be your Oatmeal login ID and cannot be changed, so think of one that is secure and easy to remember. 6. Create a Oatmeal password. You can change your password at any time. 7. Enter your Password Reset Question and Answer. This can be used at a later time if you forget your password. 8. Enter your e-mail address. You will receive e-mail notification when new information is available in [address] 9. Click Sign Up. You can now view and download portions of your medical record. 10. Click the Download Summary menu link to download a portable copy of your medical information. If you have questions, please visit the Frequently Asked Questions section of the Oatmeal website. Remember, Oatmeal is NOT to be used for urgent needs. For medical emergencies, dial 911. Now available from your iPhone and Android! Introducing Robert Stevens As a Anirudh Lugo patient, I wanted to make you aware of our electronic visit tool called Robert Stevens. Reillykaleb Gregjorden 24/7 allows you to connect within minutes with a medical provider 24 hours a day, seven days a week via a mobile device or tablet or logging into a secure website from your computer. You can access Robert Stevens from anywhere in the [place].  
 
A virtual visit might be right for you when you have a simple condition and feel like you just dont want to get out of bed, or cant get away from work for an appointment, when your regular [facility] provider is not available (evenings, weekends or holidays), or when youre out of town and need minor care. Electronic visits cost only $49 and if the Ye Robert 24/7 provider determines a prescription is needed to treat your condition, one can be electronically transmitted to a nearby pharmacy*. Please take a moment to enroll today if you have not already done so. The enrollment process is free and takes just a few minutes. To enroll, please download the Immunexpress 24/7 [place] to your tablet or phone, or visit [email or web address] to enroll on your computer. And, as an [address] [place] [address] patient with a Identropy account, the results of your visits will be scanned into your electronic medical record and your primary care provider will be able to view the scanned results. We urge you to continue to see your regular [facility] provider for your ongoing medical care. And while your primary care provider may not be the one available when you seek a WineNice virtual visit, the peace of mind you get from getting a real diagnosis real time can be priceless. For more information on WineNice, view our Frequently Asked Questions (FAQs) at [email or web address]. Sincerely, 
 
Bernardino Cuello MD 
Chief Medical Officer [facility] Adilene Troy *:  certain medications cannot be prescribed via WineNice Providers Seen During Your Hospitalization Provider Specialty Primary office phone Mally Gutierrez, MARIA LUISA Podiatry [phone] Your Primary Care Physician (PCP) Primary Care Physician Office Phone Office Fax UNKNOWN, PROVIDER ** None ** ** None ** You are allergic to the following Allergen Reactions Sulfa (Sulfonamide Antibiotics) Other (comments) Syncope Recent Documentation Height Weight BMI Smoking Status 1.626 m 60.8 kg 23 kg/m2 Never Smoker Emergency Contacts Name Discharge Info Relation Home Work Mobile [facility] DISCHARGE CAREGIVER [3] Daughter [21] [phone] Patient Belongings The following personal items are in your possession at time of discharge: 
  Dental Appliances: None  Visual Aid: Glasses, With patient      Home Medications: None   Jewelry: None  Clothing: Undergarments, With patient, Pants, Shirt, Sweater, Footwear (bra)    Other Valuables: Purse (with daughter) Please provide this summary of care documentation to your next provider. Signatures-by signing, you are acknowledging that this After Visit Summary has been reviewed with you and you have received a copy. Patient Signature:  ____________________________________________________________ Date:  ____________________________________________________________  
  
Chris Valerio Provider Signature:  ____________________________________________________________ Date:  ____________________________________________________________

## 2018-07-19 NOTE — ANESTHESIA POSTPROCEDURE EVALUATION
Post-Anesthesia Evaluation and Assessment    Patient: Dori Rodriguez MRN: [id number]  SSN: [id number]    YOB: 1960  Age: 63 y.o. Sex: female       Cardiovascular Function/Vital Signs  Visit Vitals    /66    Pulse 85    Temp 36.6 °C (97.9 °F)    Resp 18    Ht 5' 4\" (1.626 m)    Wt 60.8 kg (134 lb)    SpO2 99%    BMI 23 kg/m2       Patient is status post general, total IV anesthesia anesthesia for Procedure(s):  CHEILECTOMY, POSSIBLE FIRST METATARSAL PHALANGEAL JOINT FUSION WITH PLATE AND SCREW FIXATION LEFT FOOT. Nausea/Vomiting: None    Postoperative hydration reviewed and adequate. Pain:  Pain Scale 1: Numeric (0 - 10) (07/19/18 1258)  Pain Intensity 1: 0 (07/19/18 1258)   Managed    Neurological Status:   Neuro (WDL): Within Defined Limits (07/19/18 1258)  Neuro  LUE Motor Response: Purposeful (07/19/18 1225)  LLE Motor Response: Purposeful (07/19/18 1225)  RUE Motor Response: Purposeful (07/19/18 1225)  RLE Motor Response: Purposeful (07/19/18 1225)   At baseline    Mental Status and Level of Consciousness: Arousable    Pulmonary Status:   O2 Device: Room air (07/19/18 1258)   Adequate oxygenation and airway patent    Complications related to anesthesia: None    Post-anesthesia assessment completed.  No concerns    Signed By: Sae Calderon MD     July 19, 2018

## 2022-03-20 PROBLEM — M20.22 HALLUX RIGIDUS OF LEFT FOOT: Status: ACTIVE | Noted: 2018-07-19

## 2024-01-25 ENCOUNTER — HOSPITAL ENCOUNTER (OUTPATIENT)
Facility: HOSPITAL | Age: 64
Discharge: HOME | End: 2024-01-25
Payer: COMMERCIAL

## 2024-01-25 DIAGNOSIS — R19.7 DIARRHEA, UNSPECIFIED TYPE: ICD-10-CM

## 2024-01-25 PROCEDURE — 3430000000 HC RX DIAGNOSTIC RADIOPHARMACEUTICAL

## 2024-01-25 PROCEDURE — 78227 HEPATOBIL SYST IMAGE W/DRUG: CPT

## 2024-01-25 PROCEDURE — A9537 TC99M MEBROFENIN: HCPCS

## 2024-01-25 RX ORDER — KIT FOR THE PREPARATION OF TECHNETIUM TC 99M MEBROFENIN 45 MG/10ML
6.6 INJECTION, POWDER, LYOPHILIZED, FOR SOLUTION INTRAVENOUS
Status: COMPLETED | OUTPATIENT
Start: 2024-01-25 | End: 2024-01-25

## 2024-01-25 RX ADMIN — MEBROFENIN 6.6 MILLICURIE: 45 INJECTION, POWDER, LYOPHILIZED, FOR SOLUTION INTRAVENOUS at 08:41

## (undated) DEVICE — GOWN,SIRUS,NONRNF,SETINSLV,XL,20/CS: Brand: MEDLINE

## (undated) DEVICE — BANDAGE COMPR ELASTIC 5 YDX6 IN

## (undated) DEVICE — OCCLUSIVE GAUZE STRIP,3% BISMUTH TRIBROMOPHENATE IN PETROLATUM BLEND: Brand: XEROFORM

## (undated) DEVICE — SUTURE VCRL 2-0 L27IN ABSRB UD CP-2 L26MM 1/2 CIR REV CUT J869H

## (undated) DEVICE — STERILE POLYISOPRENE POWDER-FREE SURGICAL GLOVES WITH EMOLLIENT COATING: Brand: PROTEXIS

## (undated) DEVICE — BANDAGE COMPR 9 FTX4 IN SMOOTH COMFORTABLE SYNTH ESMRK LF

## (undated) DEVICE — INTENDED FOR TISSUE SEPARATION, AND OTHER PROCEDURES THAT REQUIRE A SHARP SURGICAL BLADE TO PUNCTURE OR CUT.: Brand: BARD-PARKER ® CARBON RIB-BACK BLADES

## (undated) DEVICE — GAUZE SPONGES,12 PLY: Brand: CURITY

## (undated) DEVICE — ROCKER SWITCH PENCIL BLADE ELECTRODE, HOLSTER: Brand: EDGE

## (undated) DEVICE — DEVON™ KNEE AND BODY STRAP 60" X 3" (1.5 M X 7.6 CM): Brand: DEVON

## (undated) DEVICE — SURGICAL PROCEDURE PACK BASIN MAJ SET CUST NO CAUT

## (undated) DEVICE — BLADE SAW W9XL31MM THK051MM CUT THK056MM REPL S STL SAG

## (undated) DEVICE — STERILE POLYISOPRENE POWDER-FREE SURGICAL GLOVES: Brand: PROTEXIS

## (undated) DEVICE — BLADE RMFG SM TEAR CRS CUT RSP --

## (undated) DEVICE — STRETCH BANDAGE ROLL: Brand: DERMACEA

## (undated) DEVICE — Device

## (undated) DEVICE — SUTURE MCRYL SZ 4-0 L27IN ABSRB UD L19MM PS-2 1/2 CIR PRIM Y426H

## (undated) DEVICE — SUTURE VCRL SZ 4-0 L18IN ABSRB UD L19MM PS-2 3/8 CIR PRIM J496H

## (undated) DEVICE — ZIMMER® STERILE DISPOSABLE TOURNIQUET CUFF WITH PROTECTIVE SLEEVE AND PLC, DUAL PORT, SINGLE BLADDER, 18 IN. (46 CM)

## (undated) DEVICE — BANDAGE COMPR SELF ADH 5 YDX4 IN TAN STRL PREMIERPRO LF

## (undated) DEVICE — SYR 5ML 1/5 GRAD LL NSAF LF --

## (undated) DEVICE — LIGHT HANDLE: Brand: DEVON

## (undated) DEVICE — DRAPE C ARM W54XL84IN MINI FOR OEC 6800

## (undated) DEVICE — DRAPE,EXTREMITY,89X128,STERILE: Brand: MEDLINE

## (undated) DEVICE — HOOK LOCK LATEX FREE ELASTIC BANDAGE 3INX5YD

## (undated) DEVICE — CONVERTORS STOCKINETTE: Brand: CONVERTORS

## (undated) DEVICE — NEEDLE HYPO 25GA L1.5IN BVL ORIENTED ECLIPSE

## (undated) DEVICE — KENDALL SCD EXPRESS SLEEVES, KNEE LENGTH, MEDIUM: Brand: KENDALL SCD

## (undated) DEVICE — (D)PREP SKN CHLRAPRP APPL 26ML -- CONVERT TO ITEM 371833

## (undated) DEVICE — SUTURE PROL SZ 4-0 L18IN NONABSORBABLE BLU L13MM P-3 3/8 8699G

## (undated) DEVICE — SUTURE MCRYL SZ 3-0 L27IN ABSRB UD L26MM SH 1/2 CIR Y416H

## (undated) DEVICE — LARGE TEAR RASP (12.7 X 7.0MM)

## (undated) DEVICE — SUTURE VCRL SZ 3-0 L27IN ABSRB UD L19MM PS-2 3/8 CIR PRIM J427H

## (undated) DEVICE — SOLUTION IV 1000ML 0.9% SOD CHL

## (undated) DEVICE — DRAPE SHT 3 QTR PROXIMA 53X77 --

## (undated) DEVICE — BASIC PACK: Brand: CONVERTORS

## (undated) DEVICE — ARGYLE FRAZIER SURGICAL SUCTION INSTRUMENT 10 FR/CH (3.3 MM): Brand: ARGYLE

## (undated) DEVICE — REM POLYHESIVE ADULT PATIENT RETURN ELECTRODE: Brand: VALLEYLAB

## (undated) DEVICE — BONE WAX WHITE: Brand: BONE WAX WHITE

## (undated) DEVICE — SYR 10ML LUER LOK 1/5ML GRAD --

## (undated) DEVICE — PADDING CST 4INX4YD --

## (undated) DEVICE — MASTISOL ADHESIVE LIQ 2/3ML